# Patient Record
Sex: FEMALE | Race: WHITE | NOT HISPANIC OR LATINO | Employment: UNEMPLOYED | URBAN - METROPOLITAN AREA
[De-identification: names, ages, dates, MRNs, and addresses within clinical notes are randomized per-mention and may not be internally consistent; named-entity substitution may affect disease eponyms.]

---

## 2022-11-18 ENCOUNTER — HOSPITAL ENCOUNTER (EMERGENCY)
Facility: HOSPITAL | Age: 37
Discharge: HOME | End: 2022-11-19
Attending: EMERGENCY MEDICINE | Admitting: EMERGENCY MEDICINE

## 2022-11-18 DIAGNOSIS — G43.001 MIGRAINE WITHOUT AURA AND WITH STATUS MIGRAINOSUS, NOT INTRACTABLE: Primary | ICD-10-CM

## 2022-11-18 PROCEDURE — 99283 EMERGENCY DEPT VISIT LOW MDM: CPT

## 2022-11-18 RX ORDER — KETOROLAC TROMETHAMINE 30 MG/ML
30 INJECTION, SOLUTION INTRAMUSCULAR; INTRAVENOUS ONCE
Status: COMPLETED | OUTPATIENT
Start: 2022-11-18 | End: 2022-11-19

## 2022-11-18 RX ORDER — METOCLOPRAMIDE HYDROCHLORIDE 5 MG/ML
10 INJECTION INTRAMUSCULAR; INTRAVENOUS ONCE
Status: COMPLETED | OUTPATIENT
Start: 2022-11-18 | End: 2022-11-19

## 2022-11-19 VITALS
RESPIRATION RATE: 18 BRPM | SYSTOLIC BLOOD PRESSURE: 131 MMHG | OXYGEN SATURATION: 100 % | BODY MASS INDEX: 31.29 KG/M2 | DIASTOLIC BLOOD PRESSURE: 98 MMHG | HEART RATE: 93 BPM | TEMPERATURE: 98.6 F | WEIGHT: 176.59 LBS | HEIGHT: 63 IN

## 2022-11-19 PROCEDURE — 96374 THER/PROPH/DIAG INJ IV PUSH: CPT

## 2022-11-19 PROCEDURE — 25010000002 KETOROLAC TROMETHAMINE PER 15 MG: Performed by: NURSE PRACTITIONER

## 2022-11-19 PROCEDURE — 25010000002 METOCLOPRAMIDE PER 10 MG: Performed by: NURSE PRACTITIONER

## 2022-11-19 PROCEDURE — 96375 TX/PRO/DX INJ NEW DRUG ADDON: CPT

## 2022-11-19 RX ADMIN — METOCLOPRAMIDE HYDROCHLORIDE 10 MG: 5 INJECTION INTRAMUSCULAR; INTRAVENOUS at 00:15

## 2022-11-19 RX ADMIN — SODIUM CHLORIDE 500 ML: 9 INJECTION, SOLUTION INTRAVENOUS at 00:16

## 2022-11-19 RX ADMIN — KETOROLAC TROMETHAMINE 30 MG: 30 INJECTION, SOLUTION INTRAMUSCULAR; INTRAVENOUS at 00:16

## 2022-11-19 NOTE — DISCHARGE INSTRUCTIONS
Rest, drink plenty of fluids.  You may take over-the-counter acetaminophen and Motrin as needed for aches pains and fever.  Follow-up with your family doctor next week or at your scheduled appointment December 1 for further evaluation and treatment as necessary.  Return to the emergency department for any acutely developing neurological symptoms, any persistent vomiting, any vision changes or any new or worse concerns.

## 2022-11-19 NOTE — ED PROVIDER NOTES
Time: 8:47 PM EST  Chief Complaint:   Chief Complaint   Patient presents with   • Headache     Pt reports HA behind eyes for over 2 weeks, OTC meds not helping. Blurry vision, fatigue, dizziness.           History of Present Illness:  Patient is a 37 y.o. year old female who presents to the emergency department with a headache that has been ongoing since the first of this month.  She says it is behind both eyes.  She does have a history of headaches and says Excedrin Migraine usually helps but it is not helping this time.  She also takes ibuprofen 800 mg which does not help.  She reports blurry vision, dizziness, headaches, and fatigue.  She tried to get in with her doctor were they cannot see her until 1 December so she said she could not make it that long.      The patient presents to the emergency department complaining of a headache that she states has been constant for the last 2 weeks.  She states that she does have a history of migraines and is prone to headaches.  She states that normally she does has about 1-2 migraines a month.  She states that she has been taking Excedrin and Motrin at home with no relief.  She called her doctor's office and is not able to get into see them until December 1.  She denies any recent head traumas.  She states that she has had no recent fevers.  She denies any neck pain and has no nuchal rigidity.  She reports nausea but no vomiting.  She does report some light sensitivity but no vision changes she states it is a stabbing pain and its either behind both eyes or her right eye.  She is alert and oriented has a grossly intact neuro exam.      History provided by:  Patient   used: No            Patient Care Team  Primary Care Provider: Provider, No Known    Past Medical History:     No Known Allergies  Past Medical History:   Diagnosis Date   • Injury of back    • Migraine      Past Surgical History:   Procedure Laterality Date   • CYST REMOVAL      thyroid  "  • DENTAL PROCEDURE       History reviewed. No pertinent family history.    Home Medications:  Prior to Admission medications    Medication Sig Start Date End Date Taking? Authorizing Provider   cyclobenzaprine (FLEXERIL) 10 MG tablet Take 1 tablet by mouth 3 (Three) Times a Day As Needed for Muscle Spasms. 11/15/22   Joseph Underwood MD   diclofenac (VOLTAREN) 75 MG EC tablet Take 1 tablet by mouth 2 (Two) Times a Day As Needed (pain). 11/15/22   Joseph Underwood MD   methylPREDNISolone (MEDROL) 4 MG dose pack Take as directed on package instructions.  Begin on 11/16/2022. 11/15/22   Joseph Underwood MD        Social History:   Social History     Tobacco Use   • Smoking status: Never   • Smokeless tobacco: Never   Vaping Use   • Vaping Use: Never used   Substance Use Topics   • Alcohol use: Never         Review of Systems:  Review of Systems   Constitutional: Positive for fatigue. Negative for chills and fever.   HENT: Negative for congestion, ear pain and sore throat.    Eyes: Positive for photophobia. Negative for pain.   Respiratory: Negative for cough, chest tightness and shortness of breath.    Cardiovascular: Negative for chest pain.   Gastrointestinal: Positive for nausea. Negative for abdominal pain, diarrhea and vomiting.   Genitourinary: Negative for flank pain, hematuria and urgency.   Musculoskeletal: Negative for back pain, joint swelling, neck pain and neck stiffness.   Skin: Negative for pallor and rash.   Neurological: Positive for dizziness and headaches. Negative for seizures.   All other systems reviewed and are negative.       Physical Exam:  /98 (BP Location: Right arm, Patient Position: Sitting)   Pulse 93   Temp 98.6 °F (37 °C) (Oral)   Resp 16   Ht 160 cm (63\")   Wt 80.1 kg (176 lb 9.4 oz)   LMP 10/26/2022   SpO2 100%   BMI 31.28 kg/m²     Physical Exam  Vitals and nursing note reviewed.   Constitutional:       General: She is not in acute distress.     Appearance: Normal " appearance. She is not ill-appearing or toxic-appearing.   HENT:      Head: Normocephalic and atraumatic.   Eyes:      General: No scleral icterus.     Conjunctiva/sclera: Conjunctivae normal.      Pupils: Pupils are equal, round, and reactive to light.   Cardiovascular:      Rate and Rhythm: Normal rate and regular rhythm.      Pulses: Normal pulses.   Pulmonary:      Effort: Pulmonary effort is normal. No respiratory distress.   Abdominal:      General: Abdomen is flat.   Musculoskeletal:         General: Normal range of motion.      Cervical back: Normal range of motion and neck supple. No rigidity or tenderness.   Lymphadenopathy:      Cervical: No cervical adenopathy.   Skin:     General: Skin is warm and dry.      Capillary Refill: Capillary refill takes less than 2 seconds.      Findings: No rash.   Neurological:      General: No focal deficit present.      Mental Status: She is alert and oriented to person, place, and time. Mental status is at baseline.   Psychiatric:         Mood and Affect: Mood normal.         Behavior: Behavior normal.                Medications in the Emergency Department:  Medications   sodium chloride 0.9 % bolus 500 mL (500 mL Intravenous New Bag 11/19/22 0016)   metoclopramide (REGLAN) injection 10 mg (10 mg Intravenous Given 11/19/22 0015)   ketorolac (TORADOL) injection 30 mg (30 mg Intravenous Given 11/19/22 0016)        Labs  Lab Results (last 24 hours)     ** No results found for the last 24 hours. **           Imaging:  No Radiology Exams Resulted Within Past 24 Hours    Procedures:  Procedures    Progress  ED Course as of 11/19/22 0152   Sat Nov 19, 2022   0148 The patient reports that her headache is resolved with her medications and is ready for discharge.  We discussed the need for her to follow-up with her family doctor at [facility] this week for reevaluation and further treatment as necessary.  She verbalized understanding of follow-up and return instructions to the ED.  [TC]      ED Course User Index  [TC] Deedee Dawson APRN                            The patient was initially evaluated in the triage area where orders were placed. The patient was later dispositioned by LEXIE Hebert.      The patient was advised to stay for completion of workup which includes but is not limited to communication of labs and radiological results, reassessment and plan. The patient was advised that leaving prior to disposition by a provider could result in critical findings that are not communicated to the patient.     Medical Decision Making:  MDM  Number of Diagnoses or Management Options  Migraine without aura and with status migrainosus, not intractable: minor  Risk of Complications, Morbidity, and/or Mortality  Presenting problems: low  Diagnostic procedures: low  Management options: low    Patient Progress  Patient progress: stable           The following orders were placed after triage and evaluation:  No orders of the defined types were placed in this encounter.      Final diagnoses:   Migraine without aura and with status migrainosus, not intractable          Disposition:  ED Disposition     ED Disposition   Discharge    Condition   Stable    Comment   --             This medical record created using voice recognition software.           Deedee Dawson APRN  11/19/22 0152

## 2025-03-18 RX ORDER — METRONIDAZOLE 500 MG/1
500 TABLET ORAL 2 TIMES DAILY
Qty: 14 TABLET | Refills: 0 | Status: SHIPPED | OUTPATIENT
Start: 2025-03-18 | End: 2025-03-25

## 2025-04-19 ENCOUNTER — OFFICE VISIT (OUTPATIENT)
Dept: FAMILY MEDICINE CLINIC | Facility: CLINIC | Age: 40
End: 2025-04-19
Payer: MEDICAID

## 2025-04-19 VITALS
TEMPERATURE: 98 F | HEIGHT: 63 IN | OXYGEN SATURATION: 98 % | HEART RATE: 108 BPM | DIASTOLIC BLOOD PRESSURE: 80 MMHG | SYSTOLIC BLOOD PRESSURE: 114 MMHG | BODY MASS INDEX: 30.76 KG/M2 | WEIGHT: 173.6 LBS

## 2025-04-19 DIAGNOSIS — Z76.89 ENCOUNTER TO ESTABLISH CARE: Primary | ICD-10-CM

## 2025-04-19 DIAGNOSIS — Z00.00 ANNUAL WELLNESS VISIT: ICD-10-CM

## 2025-04-19 DIAGNOSIS — B96.89 BV (BACTERIAL VAGINOSIS): ICD-10-CM

## 2025-04-19 DIAGNOSIS — N76.0 BV (BACTERIAL VAGINOSIS): ICD-10-CM

## 2025-04-19 DIAGNOSIS — G43.709 CHRONIC MIGRAINE WITHOUT AURA WITHOUT STATUS MIGRAINOSUS, NOT INTRACTABLE: ICD-10-CM

## 2025-04-19 RX ORDER — CYCLOBENZAPRINE HCL 5 MG
TABLET ORAL AS NEEDED
COMMUNITY

## 2025-04-19 RX ORDER — MAGNESIUM OXIDE 400 MG/1
1 TABLET ORAL NIGHTLY
COMMUNITY

## 2025-04-19 RX ORDER — TOPIRAMATE 50 MG/1
2 TABLET, FILM COATED ORAL EVERY EVENING
COMMUNITY
Start: 2025-03-03

## 2025-04-19 RX ORDER — ONDANSETRON 4 MG/1
4 TABLET, ORALLY DISINTEGRATING ORAL AS NEEDED
COMMUNITY

## 2025-04-19 RX ORDER — IBUPROFEN 800 MG/1
800 TABLET, FILM COATED ORAL EVERY 8 HOURS PRN
COMMUNITY
Start: 2025-02-14

## 2025-04-19 NOTE — PROGRESS NOTES
"Chief Complaint  Wellness Check    Little interest or pleasure in doing things? Not at all   Feeling down, depressed, or hopeless? Not at all   PHQ-2 Total Score 0          History of Present Illness:  Renea Salamanca is a 39 y.o. female who presents to [facility] with a past medical history of  Past Medical History:   Diagnosis Date    Injury of back     Migraine         History of Present Illness  The patient is a 39-year-old female who presents today to [facility] care.    She has been experiencing recurrent bacterial vaginosis (BV), which she attributes to tampon use. Her last sexual encounter was some time ago. Follow-up care post-treatment for BV has not been sought. Currently, menstrual spotting started yesterday, and she is curious to see if the BV recurs. No external vaginal dryness is reported, but internal dryness is noted upon tampon removal. Sensitivity to certain products, such as perfumes, can trigger yeast infections. Metronidazole has been prescribed for BV and Diflucan for yeast infections in the past. Probiotics have been attempted to manage symptoms but have not been effective.    Medication for migraines is currently being taken, which is reported as effective. No other known diagnoses are present. Previous consultations were with a healthcare provider at [facility], but the distance was found inconvenient. No cardiac issues or depression are reported. Menstrual cycles are regular. Polycystic ovary syndrome (PCOS) is not present.    GYNECOLOGICAL HISTORY:  - Last Menstrual Period: 04/2025      Objective   Vital Signs:   Vitals:    04/19/25 0856   BP: 114/80   BP Location: Left arm   Patient Position: Sitting   Cuff Size: Large Adult   Pulse: 108   Temp: 98 °F (36.7 °C)   TempSrc: Infrared   SpO2: 98%   Weight: 78.7 kg (173 lb 9.6 oz)   Height: 160.5 cm (63.2\")     Body mass index is 30.56 kg/m².    Wt Readings from Last 3 Encounters:   04/19/25 78.7 kg (173 lb 9.6 " oz)   11/18/22 80.1 kg (176 lb 9.4 oz)   11/15/22 79.8 kg (176 lb)     BP Readings from Last 3 Encounters:   04/19/25 114/80   11/19/22 131/98   11/15/22 123/87       Health Maintenance   Topic Date Due    TDAP/TD VACCINES (1 - Tdap) Never done    PAP SMEAR  Never done    COVID-19 Vaccine (1 - 2024-25 season) Never done    HEPATITIS C SCREENING  Never done    ANNUAL PHYSICAL  Never done    INFLUENZA VACCINE  07/01/2025    Pneumococcal Vaccine 0-49  Aged Out       Review of Systems   Physical Exam  Vitals reviewed.   Constitutional:       Appearance: Normal appearance.   Eyes:      Pupils: Pupils are equal, round, and reactive to light.   Cardiovascular:      Rate and Rhythm: Normal rate and regular rhythm.   Pulmonary:      Effort: Pulmonary effort is normal.      Breath sounds: Normal breath sounds.   Skin:     General: Skin is warm and dry.   Neurological:      General: No focal deficit present.      Mental Status: She is alert and oriented to person, place, and time.   Psychiatric:         Mood and Affect: Mood normal.            Result Review :  The following data was reviewed by: LEXIE Ho on 04/19/2025:  No visits with results within 1 Month(s) from this visit.   Latest known visit with results is:   Admission on 11/15/2022, Discharged on 11/15/2022   Component Date Value    QT Interval 11/15/2022 343        Results        Procedures        Assessment and Plan   Diagnoses and all orders for this visit:    1. Encounter to establish care (Primary)    2. Annual wellness visit    3. BV (bacterial vaginosis)    4. Chronic migraine without aura without status migrainosus, not intractable        Assessment & Plan  1. Recurrent bacterial vaginosis (BV).  - Recurrent BV may be attributed to internal dryness, potentially causing small fissures that facilitate bacterial entry. The use of tampons could also contribute to this condition.  - BV is not exclusively sexually transmitted and can occur due to a pH  imbalance. Probiotics do not influence vaginal pH levels.  - Advised to maintain optimal hygiene using alcohol-free wipes. If symptoms persist, boric acid suppositories will be considered to regulate the pH balance. If these measures prove ineffective, the use of estradiol cream twice weekly may be beneficial in alleviating internal dryness.  - If oral medications are ineffective, internal metronidazole will be considered.    2. Migraines.  - Currently taking medications for migraines, which are effective.  - [facility] to facilitate communication and medication refills.  - Once insurance is active, she should inform the office to backdate any necessary treatments or prescriptions.      BMI is >= 30 and <35. (Class 1 Obesity). The following options were offered after discussion;: weight loss educational material (shared in after visit summary)         FOLLOW UP  Return in about 4 weeks (around 5/17/2025) for Recheck.    Patient was given instructions and counseling regarding her condition or for health maintenance advice. Please see specific information pulled into the AVS if appropriate.       LEXIE Ho  04/21/25  10:07 EDT    CURRENT & DISCONTINUED MEDICATIONS  Current Outpatient Medications   Medication Instructions    cyclobenzaprine (FLEXERIL) 5 MG tablet As Needed    ibuprofen (ADVIL,MOTRIN) 800 mg, Every 8 Hours PRN    magnesium oxide (MAG-OX) 400 MG tablet 1 tablet, Nightly    ondansetron ODT (ZOFRAN-ODT) 4 mg, As Needed    topiramate (TOPAMAX) 50 MG tablet 2 tablets, Every Evening    ubrogepant (UBRELVY) 100 mg, As Needed       There are no discontinued medications.     EMR Dragon/Transcription disclaimer:  Parts of this encounter note are electronic transcription/translation of spoken language to printed text.     Patient or patient representative verbalized consent for the use of Ambient Listening during the visit with  LEXIE Ho for chart  documentation. 4/21/2025  09:01 EDT

## 2025-08-13 ENCOUNTER — OFFICE VISIT (OUTPATIENT)
Dept: FAMILY MEDICINE CLINIC | Facility: CLINIC | Age: 40
End: 2025-08-13
Payer: COMMERCIAL

## 2025-08-13 VITALS
TEMPERATURE: 97.8 F | BODY MASS INDEX: 31.18 KG/M2 | DIASTOLIC BLOOD PRESSURE: 86 MMHG | OXYGEN SATURATION: 99 % | HEIGHT: 63 IN | HEART RATE: 109 BPM | WEIGHT: 176 LBS | SYSTOLIC BLOOD PRESSURE: 118 MMHG

## 2025-08-13 DIAGNOSIS — E66.01 MORBID (SEVERE) OBESITY DUE TO EXCESS CALORIES: ICD-10-CM

## 2025-08-13 DIAGNOSIS — Z13.1 DIABETES MELLITUS SCREENING: ICD-10-CM

## 2025-08-13 DIAGNOSIS — R53.83 OTHER FATIGUE: ICD-10-CM

## 2025-08-13 DIAGNOSIS — Z13.29 THYROID DISORDER SCREEN: ICD-10-CM

## 2025-08-13 DIAGNOSIS — Z11.59 NEED FOR HEPATITIS C SCREENING TEST: ICD-10-CM

## 2025-08-13 DIAGNOSIS — R42 DIZZINESS: ICD-10-CM

## 2025-08-13 DIAGNOSIS — Z91.89 AT INCREASED RISK FOR CARDIOVASCULAR DISEASE: ICD-10-CM

## 2025-08-13 DIAGNOSIS — G43.709 CHRONIC MIGRAINE WITHOUT AURA WITHOUT STATUS MIGRAINOSUS, NOT INTRACTABLE: Primary | ICD-10-CM

## 2025-08-13 DIAGNOSIS — Z23 IMMUNIZATION DUE: ICD-10-CM

## 2025-08-13 DIAGNOSIS — Z82.41 FAMILY HISTORY OF DEATH DUE TO HEART PROBLEM AT AGE YOUNGER THAN 50 YEARS: ICD-10-CM

## 2025-08-13 DIAGNOSIS — Z13.6 SCREENING FOR CARDIOVASCULAR CONDITION: ICD-10-CM

## 2025-08-13 LAB
25(OH)D3 SERPL-MCNC: 22.4 NG/ML (ref 30–100)
ALBUMIN SERPL-MCNC: 4 G/DL (ref 3.5–5.2)
ALBUMIN/GLOB SERPL: 1.1 G/DL
ALP SERPL-CCNC: 96 U/L (ref 39–117)
ALT SERPL W P-5'-P-CCNC: 11 U/L (ref 1–33)
ANION GAP SERPL CALCULATED.3IONS-SCNC: 11.5 MMOL/L (ref 5–15)
AST SERPL-CCNC: 14 U/L (ref 1–32)
BASOPHILS # BLD AUTO: 0.07 10*3/MM3 (ref 0–0.2)
BASOPHILS NFR BLD AUTO: 0.9 % (ref 0–1.5)
BILIRUB SERPL-MCNC: <0.2 MG/DL (ref 0–1.2)
BUN SERPL-MCNC: 11 MG/DL (ref 6–20)
BUN/CREAT SERPL: 11.2 (ref 7–25)
CALCIUM SPEC-SCNC: 9.4 MG/DL (ref 8.6–10.5)
CHLORIDE SERPL-SCNC: 105 MMOL/L (ref 98–107)
CHOLEST SERPL-MCNC: 184 MG/DL (ref 0–200)
CO2 SERPL-SCNC: 23.5 MMOL/L (ref 22–29)
CREAT SERPL-MCNC: 0.98 MG/DL (ref 0.57–1)
DEPRECATED RDW RBC AUTO: 41.4 FL (ref 37–54)
EGFRCR SERPLBLD CKD-EPI 2021: 75.9 ML/MIN/1.73
EOSINOPHIL # BLD AUTO: 0.14 10*3/MM3 (ref 0–0.4)
EOSINOPHIL NFR BLD AUTO: 1.8 % (ref 0.3–6.2)
ERYTHROCYTE [DISTWIDTH] IN BLOOD BY AUTOMATED COUNT: 12.8 % (ref 12.3–15.4)
FERRITIN SERPL-MCNC: 26.7 NG/ML (ref 13–150)
FOLATE SERPL-MCNC: 12.7 NG/ML (ref 4.78–24.2)
GLOBULIN UR ELPH-MCNC: 3.6 GM/DL
GLUCOSE SERPL-MCNC: 115 MG/DL (ref 65–99)
HBA1C MFR BLD: 5.4 % (ref 4.8–5.6)
HCT VFR BLD AUTO: 41.2 % (ref 34–46.6)
HCV AB SER QL: NORMAL
HDLC SERPL-MCNC: 53 MG/DL (ref 40–60)
HGB BLD-MCNC: 13.4 G/DL (ref 12–15.9)
IMM GRANULOCYTES # BLD AUTO: 0.02 10*3/MM3 (ref 0–0.05)
IMM GRANULOCYTES NFR BLD AUTO: 0.3 % (ref 0–0.5)
IRON 24H UR-MRATE: 19 MCG/DL (ref 37–145)
IRON SATN MFR SERPL: 4 % (ref 20–50)
LDLC SERPL CALC-MCNC: 108 MG/DL (ref 0–100)
LDLC/HDLC SERPL: 1.98 {RATIO}
LYMPHOCYTES # BLD AUTO: 2.09 10*3/MM3 (ref 0.7–3.1)
LYMPHOCYTES NFR BLD AUTO: 27.5 % (ref 19.6–45.3)
MCH RBC QN AUTO: 28.8 PG (ref 26.6–33)
MCHC RBC AUTO-ENTMCNC: 32.5 G/DL (ref 31.5–35.7)
MCV RBC AUTO: 88.6 FL (ref 79–97)
MONOCYTES # BLD AUTO: 0.57 10*3/MM3 (ref 0.1–0.9)
MONOCYTES NFR BLD AUTO: 7.5 % (ref 5–12)
NEUTROPHILS NFR BLD AUTO: 4.71 10*3/MM3 (ref 1.7–7)
NEUTROPHILS NFR BLD AUTO: 62 % (ref 42.7–76)
NRBC BLD AUTO-RTO: 0 /100 WBC (ref 0–0.2)
PLATELET # BLD AUTO: 449 10*3/MM3 (ref 140–450)
PMV BLD AUTO: 9.6 FL (ref 6–12)
POTASSIUM SERPL-SCNC: 3.3 MMOL/L (ref 3.5–5.2)
PROT SERPL-MCNC: 7.6 G/DL (ref 6–8.5)
RBC # BLD AUTO: 4.65 10*6/MM3 (ref 3.77–5.28)
SODIUM SERPL-SCNC: 140 MMOL/L (ref 136–145)
T4 FREE SERPL-MCNC: 1.38 NG/DL (ref 0.92–1.68)
TIBC SERPL-MCNC: 472 MCG/DL (ref 298–536)
TRANSFERRIN SERPL-MCNC: 317 MG/DL (ref 200–360)
TRIGL SERPL-MCNC: 131 MG/DL (ref 0–150)
TSH SERPL DL<=0.05 MIU/L-ACNC: 0.5 UIU/ML (ref 0.27–4.2)
VIT B12 BLD-MCNC: 734 PG/ML (ref 211–946)
VLDLC SERPL-MCNC: 23 MG/DL (ref 5–40)
WBC NRBC COR # BLD AUTO: 7.6 10*3/MM3 (ref 3.4–10.8)

## 2025-08-13 PROCEDURE — 83036 HEMOGLOBIN GLYCOSYLATED A1C: CPT

## 2025-08-13 PROCEDURE — 82728 ASSAY OF FERRITIN: CPT

## 2025-08-13 PROCEDURE — 83540 ASSAY OF IRON: CPT

## 2025-08-13 PROCEDURE — 82607 VITAMIN B-12: CPT

## 2025-08-13 PROCEDURE — 80053 COMPREHEN METABOLIC PANEL: CPT

## 2025-08-13 PROCEDURE — 84439 ASSAY OF FREE THYROXINE: CPT

## 2025-08-13 PROCEDURE — 82746 ASSAY OF FOLIC ACID SERUM: CPT

## 2025-08-13 PROCEDURE — 85025 COMPLETE CBC W/AUTO DIFF WBC: CPT

## 2025-08-13 PROCEDURE — 86803 HEPATITIS C AB TEST: CPT

## 2025-08-13 PROCEDURE — 82306 VITAMIN D 25 HYDROXY: CPT

## 2025-08-13 PROCEDURE — 84466 ASSAY OF TRANSFERRIN: CPT

## 2025-08-13 PROCEDURE — 84443 ASSAY THYROID STIM HORMONE: CPT

## 2025-08-13 PROCEDURE — 86376 MICROSOMAL ANTIBODY EACH: CPT

## 2025-08-13 PROCEDURE — 80061 LIPID PANEL: CPT

## 2025-08-13 RX ORDER — SEMAGLUTIDE 0.25 MG/.5ML
0.25 INJECTION, SOLUTION SUBCUTANEOUS WEEKLY
Qty: 2 ML | Refills: 0 | Status: SHIPPED | OUTPATIENT
Start: 2025-08-13

## 2025-08-13 RX ORDER — TOPIRAMATE 100 MG/1
100 TABLET, FILM COATED ORAL 2 TIMES DAILY
COMMUNITY

## 2025-08-13 RX ORDER — IBUPROFEN 800 MG/1
800 TABLET, FILM COATED ORAL EVERY 8 HOURS PRN
Qty: 100 TABLET | Refills: 1 | Status: SHIPPED | OUTPATIENT
Start: 2025-08-13

## 2025-08-14 LAB — THYROPEROXIDASE AB SERPL-ACNC: 42 IU/ML (ref 0–34)

## 2025-08-23 ENCOUNTER — OFFICE VISIT (OUTPATIENT)
Dept: FAMILY MEDICINE CLINIC | Facility: CLINIC | Age: 40
End: 2025-08-23
Payer: COMMERCIAL

## 2025-08-23 VITALS
HEART RATE: 105 BPM | HEIGHT: 63 IN | BODY MASS INDEX: 30.48 KG/M2 | OXYGEN SATURATION: 98 % | SYSTOLIC BLOOD PRESSURE: 120 MMHG | TEMPERATURE: 97.8 F | WEIGHT: 172 LBS | DIASTOLIC BLOOD PRESSURE: 84 MMHG

## 2025-08-23 DIAGNOSIS — R07.89 CHEST HEAVINESS: ICD-10-CM

## 2025-08-23 DIAGNOSIS — F41.9 ANXIETY: Primary | ICD-10-CM

## 2025-08-23 PROBLEM — G44.309 HEADACHES DUE TO OLD HEAD TRAUMA: Status: ACTIVE | Noted: 2022-10-06

## 2025-08-23 PROBLEM — S09.90XS HEADACHES DUE TO OLD HEAD TRAUMA: Status: ACTIVE | Noted: 2022-10-06

## 2025-08-23 PROBLEM — R51.9 CHRONIC HEADACHE DISORDER: Status: ACTIVE | Noted: 2024-02-05

## 2025-08-23 PROBLEM — J38.7 EPIGLOTTIC CYST: Status: ACTIVE | Noted: 2024-02-05

## 2025-08-23 PROBLEM — M54.9 CHRONIC BACK PAIN: Status: ACTIVE | Noted: 2024-02-05

## 2025-08-23 PROBLEM — G89.29 CHRONIC BACK PAIN: Status: ACTIVE | Noted: 2024-02-05

## 2025-08-23 PROBLEM — G89.29 CHRONIC HEADACHE DISORDER: Status: ACTIVE | Noted: 2024-02-05

## 2025-08-23 PROCEDURE — 99214 OFFICE O/P EST MOD 30 MIN: CPT | Performed by: NURSE PRACTITIONER

## 2025-08-23 PROCEDURE — 93000 ELECTROCARDIOGRAM COMPLETE: CPT | Performed by: NURSE PRACTITIONER

## 2025-08-23 RX ORDER — BUSPIRONE HYDROCHLORIDE 5 MG/1
5 TABLET ORAL 3 TIMES DAILY PRN
Qty: 90 TABLET | Refills: 0 | Status: SHIPPED | OUTPATIENT
Start: 2025-08-23

## 2025-08-23 RX ORDER — FLUOXETINE 10 MG/1
10 CAPSULE ORAL DAILY
Qty: 30 CAPSULE | Refills: 1 | Status: SHIPPED | OUTPATIENT
Start: 2025-08-23

## 2025-08-29 ENCOUNTER — HOSPITAL ENCOUNTER (OUTPATIENT)
Dept: ULTRASOUND IMAGING | Facility: HOSPITAL | Age: 40
Discharge: HOME | End: 2025-08-29
Payer: COMMERCIAL

## 2025-08-29 PROCEDURE — 76536 US EXAM OF HEAD AND NECK: CPT
